# Patient Record
Sex: FEMALE | Race: WHITE | URBAN - METROPOLITAN AREA
[De-identification: names, ages, dates, MRNs, and addresses within clinical notes are randomized per-mention and may not be internally consistent; named-entity substitution may affect disease eponyms.]

---

## 2021-07-29 ENCOUNTER — APPOINTMENT (OUTPATIENT)
Dept: URBAN - METROPOLITAN AREA SURGERY 18 | Age: 56
Setting detail: DERMATOLOGY
End: 2021-08-02

## 2021-07-29 VITALS — TEMPERATURE: 97.5 F | HEART RATE: 74 BPM | DIASTOLIC BLOOD PRESSURE: 77 MMHG | SYSTOLIC BLOOD PRESSURE: 115 MMHG

## 2021-07-29 PROBLEM — C44.329 SQUAMOUS CELL CARCINOMA OF SKIN OF OTHER PARTS OF FACE: Status: ACTIVE | Noted: 2021-07-29

## 2021-07-29 PROCEDURE — 13132 CMPLX RPR F/C/C/M/N/AX/G/H/F: CPT

## 2021-07-29 PROCEDURE — OTHER MOHS SURGERY: OTHER

## 2021-07-29 PROCEDURE — OTHER ADDITIONAL NOTES: OTHER

## 2021-07-29 PROCEDURE — OTHER REFERRAL CORRESPONDENCE: OTHER

## 2021-07-29 PROCEDURE — OTHER COUNSELING: OTHER

## 2021-07-29 PROCEDURE — 17311 MOHS 1 STAGE H/N/HF/G: CPT

## 2021-07-29 NOTE — PROCEDURE: ADDITIONAL NOTES
Additional Notes: * Size/potential extent of tumor reviewed prior to surgery. Patient viewed outlined clinical extent of first stage, along with possible tumor extension prior to the procedure. The patient understands the tumor could have microscopic extension well beyond the outlined area. \\n* We discussed the most likely options for wound reconstruction along with their respective risks/benefits/all possible outcomes (including the appearance of the final healed surgical scar). The likelihood of postoperative complications were reviewed in detail (see procedure note) along with the likelihood of temporary or long-lasting/permanent anesthesia surrounding the surgical site (regional anesthesia of the cutaneous sensory nerve). The patient was given the opportunity to ask questions prior to the start of the procedure and the procedure was only begun once the patient indicated all questions/concerns had been addressed and that the patient was in agreement with the plan for treatment/reconstruction.
Detail Level: Simple
Render Risk Assessment In Note?: no

## 2023-08-09 NOTE — PROCEDURE: MOHS SURGERY
Provider Procedure Text (D): After obtaining clear surgical margins the defect was repaired by another provider. Complex Repair And Burow's Graft Text: The defect edges were debeveled with a #15 scalpel blade.  The primary defect was closed partially with a complex linear closure.  Given the location of the defect, shape of the defect, the proximity to free margins and the presence of a standing cone deformity a Burow's graft was deemed most appropriate to repair the remaining defect.  The graft was trimmed to fit the size of the remaining defect.  The graft was then placed in the primary defect, oriented appropriately, and sutured into place.
